# Patient Record
Sex: MALE | Race: BLACK OR AFRICAN AMERICAN | NOT HISPANIC OR LATINO | Employment: FULL TIME | URBAN - METROPOLITAN AREA
[De-identification: names, ages, dates, MRNs, and addresses within clinical notes are randomized per-mention and may not be internally consistent; named-entity substitution may affect disease eponyms.]

---

## 2017-05-18 ENCOUNTER — CONVERSION ENCOUNTER (OUTPATIENT)
Dept: FAMILY MEDICINE CLINIC | Facility: CLINIC | Age: 27
End: 2017-05-18

## 2018-06-22 ENCOUNTER — CONVERSION ENCOUNTER (OUTPATIENT)
Dept: FAMILY MEDICINE CLINIC | Facility: CLINIC | Age: 28
End: 2018-06-22

## 2019-06-04 VITALS
RESPIRATION RATE: 12 BRPM | OXYGEN SATURATION: 98 % | BODY MASS INDEX: 21.38 KG/M2 | RESPIRATION RATE: 16 BRPM | WEIGHT: 149.38 LBS | HEART RATE: 72 BPM | SYSTOLIC BLOOD PRESSURE: 108 MMHG | HEART RATE: 64 BPM | DIASTOLIC BLOOD PRESSURE: 65 MMHG | SYSTOLIC BLOOD PRESSURE: 114 MMHG | HEIGHT: 70 IN | WEIGHT: 149.31 LBS | DIASTOLIC BLOOD PRESSURE: 67 MMHG | OXYGEN SATURATION: 97 %

## 2020-04-05 ENCOUNTER — TELEPHONE (OUTPATIENT)
Dept: FAMILY MEDICINE CLINIC | Facility: CLINIC | Age: 30
End: 2020-04-05

## 2020-04-05 NOTE — TELEPHONE ENCOUNTER
ON CALL MESSAGE:    Pt. Called with concern of fevers since April 1.  He says he has been taking Tylenol and the fevers come down but then go back up when the Tylenol wears off.  He reports temps have been around 99 but this morning he noted a temp of 100.5. He has also been chilled this morning.  He had sore throat a few days ago and does report a mild cough.  Denies any SOA. He wasn't sure if he should go to work today.      He was advised to not go to work and to go ahead and self-quarantine.  He will continue taking Tylenol as needed for fever or aches.  Make sure getting plenty of fluids and rest.  We discussed that should symptoms worsen he can go to ER or [facility] for evaluation.  He will contact his PCP tomorrow about getting a work note.

## 2020-04-06 NOTE — TELEPHONE ENCOUNTER
"I called pt to see if needing 2 wks note, and he states he works at [employer] and had a registered temp of \"110.0\" and sent him home. States when he got home it was \"105\" and then today is was \"102.\" He is concerned, bc he wants to see his son. Dr Hamilton was informed and pt was advised to go to [facility] for evaluation due to symptoms and they can proceed w work note as needed.  "